# Patient Record
Sex: FEMALE
[De-identification: names, ages, dates, MRNs, and addresses within clinical notes are randomized per-mention and may not be internally consistent; named-entity substitution may affect disease eponyms.]

---

## 2020-05-02 ENCOUNTER — HOSPITAL ENCOUNTER (INPATIENT)
Dept: HOSPITAL 92 - L&D/OP | Age: 40
LOS: 1 days | Discharge: HOME | End: 2020-05-03
Attending: FAMILY MEDICINE | Admitting: FAMILY MEDICINE
Payer: COMMERCIAL

## 2020-05-02 VITALS — BODY MASS INDEX: 33.6 KG/M2

## 2020-05-02 DIAGNOSIS — Z3A.37: ICD-10-CM

## 2020-05-02 LAB
A1 MICROGLOB PLACENTAL VAG QL: (no result)
AMNISURE INTERNAL CONTROL QC: (no result)
HBSAG INDEX: 0.15 S/CO (ref 0–0.99)
HGB BLD-MCNC: 13 G/DL (ref 12–16)
MCH RBC QN AUTO: 31.8 PG (ref 27–31)
MCV RBC AUTO: 91.1 FL (ref 78–98)
PLATELET # BLD AUTO: 247 THOU/UL (ref 130–400)
RBC # BLD AUTO: 4.1 MILL/UL (ref 4.2–5.4)
SYPHILIS ANTIBODY INDEX: 0.02 S/CO
WBC # BLD AUTO: 10.1 THOU/UL (ref 4.8–10.8)

## 2020-05-02 PROCEDURE — 86850 RBC ANTIBODY SCREEN: CPT

## 2020-05-02 PROCEDURE — 85027 COMPLETE CBC AUTOMATED: CPT

## 2020-05-02 PROCEDURE — 90715 TDAP VACCINE 7 YRS/> IM: CPT

## 2020-05-02 PROCEDURE — 36415 COLL VENOUS BLD VENIPUNCTURE: CPT

## 2020-05-02 PROCEDURE — 51702 INSERT TEMP BLADDER CATH: CPT

## 2020-05-02 PROCEDURE — 86900 BLOOD TYPING SEROLOGIC ABO: CPT

## 2020-05-02 PROCEDURE — 84112 EVAL AMNIOTIC FLUID PROTEIN: CPT

## 2020-05-02 PROCEDURE — 86780 TREPONEMA PALLIDUM: CPT

## 2020-05-02 PROCEDURE — 86901 BLOOD TYPING SEROLOGIC RH(D): CPT

## 2020-05-02 PROCEDURE — 87340 HEPATITIS B SURFACE AG IA: CPT

## 2020-05-02 RX ADMIN — DOCUSATE CALCIUM SCH MG: 240 CAPSULE, LIQUID FILLED ORAL at 21:32

## 2020-05-02 NOTE — PDOC.LDHP
Labor and Delivery H&P


Chief complaint: loss of fluid (SROM at 0100 am)


HPI: 





C/O SROM at 0100 today. Clear. Minimal contractions on arrival to L&D.


Current gestational age (weeks): 37


Due date: 20


Dating criteria: first trimester ultrasound, second trimester ultrasound


Grav: 2


Para: 1


OB History Details: 





Routine OB course. No complications.


Abnormal US findings: No


Current medications: pre- vitamins


Allergies/Adverse Reactions: 


 Allergies











Allergy/AdvReac Type Severity Reaction Status Date / Time


 


No Known Allergies Allergy   Verified 20 03:15














- Physical Exam


Vital signs reviewed and normal: yes


General: NAD, resting


Heart: RRR


Lungs: CTAB


Abdomen: gravid


Extremeties: no edema


FHT: category 1, variability present





- Vaginal Exam


cm dilated: 2


Effacement: 50%


Station: -2





- OB Labs


Blood type: O


RH: positive


Antibody Screen: negative


HIV: negative


RPR: negative


HEPSAg: negative


1 hour GCT: positive (144)


3 hour GTT: 70/117/155/135


GBS: negative


Urine drug screen: not done


Rubella: immune





- Assessment


L&D Assessment: term rupture in membranes





- Plan


Plan: admit to L&D, informed consent obtained, anesthesia consult for pain 

management

## 2020-05-02 NOTE — PDOC.OPDEL
OB Operative/Delivery Note


Delivery Dr/Surgeon: Sivakumar


Pre-Delivery Diagnosis: active labor


Procedure/Post Delivery Dx: spontaneous vaginal delivery (Head OA, nuchal cord 

not reduced prior to the shoulders, shoulders and body easily followed then 

untangled from the cord. Vigorous cry. Infant to mother's abdomen.)


Weeks gestation: 37


Anesthesia: epidural





- Findings


  ** A


Sex: female


Apgar - 1 min: 9


Apgar - 5 min: 9





- Additional Findings/Plan


Placenta delivered: spontaneous (Intact, 3 vessel cord)


Repaired Obstetrical Laceration: none


Estimated blood loss: 300


Post delivery plan: routine recovery

## 2020-05-03 VITALS — SYSTOLIC BLOOD PRESSURE: 101 MMHG | TEMPERATURE: 98 F | DIASTOLIC BLOOD PRESSURE: 57 MMHG

## 2020-05-03 RX ADMIN — DOCUSATE CALCIUM SCH MG: 240 CAPSULE, LIQUID FILLED ORAL at 09:22

## 2020-05-03 NOTE — PDOC.PP
Post Partum Progress Note


Post Partum Day #: 1


Subjective: 





Doing well. No complaints. Pain controlled. Lochia normal.


PO intake tolerated: yes


Flatus: yes


Ambulation: yes


 Vital Signs (12 hours)











  Temp Pulse Resp BP Pulse Ox


 


 05/03/20 07:19  98 F  78  20  101/57 L  95


 


 05/03/20 04:24  97.9 F  82  16  105/62  98


 


 05/02/20 23:36  98.2 F  88  18  91/51 L  98








 Weight











Weight                         215 lb

















- Physical Examination


General: NAD


Cardiovascular: no m/r/g, RRR


Respiratory: clear to auscultation bilaterally, non-labored breathing


Abdominal: + bowel sounds, lochia, no distention, appropriately TTP


Neurological: no gross focal deficits


Psychiatric: A&Ox3


Result Diagrams: 


 05/02/20 04:15





Additional Labs: 


 Post Partum Labs











Blood Type  O POSITIVE   05/02/20  04:15    


 


Hep Bs Antigen  Non-Reactive S/CO (NonReactive)   05/02/20  04:15    











(1) Vaginal delivery


Code(s): O80 - ENCOUNTER FOR FULL-TERM UNCOMPLICATED DELIVERY   Status: Acute   





- Assessment/Plan





Routine postpartum care


D/C home


F/U in 6 weeks